# Patient Record
Sex: FEMALE | Employment: FULL TIME | URBAN - METROPOLITAN AREA
[De-identification: names, ages, dates, MRNs, and addresses within clinical notes are randomized per-mention and may not be internally consistent; named-entity substitution may affect disease eponyms.]

---

## 2020-06-08 ENCOUNTER — OFFICE VISIT (OUTPATIENT)
Dept: FAMILY MEDICINE | Facility: CLINIC | Age: 30
End: 2020-06-08
Payer: COMMERCIAL

## 2020-06-08 ENCOUNTER — ANCILLARY PROCEDURE (OUTPATIENT)
Dept: GENERAL RADIOLOGY | Facility: CLINIC | Age: 30
End: 2020-06-08
Attending: FAMILY MEDICINE
Payer: COMMERCIAL

## 2020-06-08 VITALS
BODY MASS INDEX: 37.95 KG/M2 | DIASTOLIC BLOOD PRESSURE: 83 MMHG | SYSTOLIC BLOOD PRESSURE: 144 MMHG | OXYGEN SATURATION: 98 % | WEIGHT: 241.8 LBS | HEIGHT: 67 IN | TEMPERATURE: 98.2 F | RESPIRATION RATE: 16 BRPM | HEART RATE: 67 BPM

## 2020-06-08 DIAGNOSIS — M79.672 LEFT FOOT PAIN: Primary | ICD-10-CM

## 2020-06-08 DIAGNOSIS — M79.672 LEFT FOOT PAIN: ICD-10-CM

## 2020-06-08 PROCEDURE — 99203 OFFICE O/P NEW LOW 30 MIN: CPT | Performed by: FAMILY MEDICINE

## 2020-06-08 PROCEDURE — 73630 X-RAY EXAM OF FOOT: CPT | Mod: LT

## 2020-06-08 ASSESSMENT — MIFFLIN-ST. JEOR: SCORE: 1854.55

## 2020-06-08 NOTE — PROGRESS NOTES
"Subjective     Natalee Sánchez is a 29 year old female who presents to clinic today for the following health issues:    HPI   Pain  Onset: Saturday night    Description:   Location: left foot-top of foot, big toe    Progression of Symptoms: worse    Accompanying Signs & Symptoms:  Other symptoms: swelling and discoloration of by toes    Precipitating factors:   Trauma or overuse: YES- sleep walks-thinks she went down the stairs and slipped  Therapies Tried and outcome: ice, elevation. ibu     Pt woke up when she was sleepwalking due to pain in her left big toe/foot  Does not know what happened other than she suspects she must have hit it on something  She did not fall but found herself on the stairs.   She has been icing and elevating her foot  It hurts to bear weight on her left foot        Reviewed and updated as needed this visit by Provider         Review of Systems   Constitutional, HEENT, cardiovascular, pulmonary, gi and gu systems are negative, except as otherwise noted.      Objective    There were no vitals taken for this visit.  There is no height or weight on file to calculate BMI.  Physical Exam   GENERAL: healthy, alert and no distress  MS: left foot with some bruising, swelling and pain to palpation over left 1st metatarsal    Diagnostic Test Results:  Labs reviewed in Epic  Xray - left foot: no fractures noted        Assessment & Plan     1. Left foot pain  Due to presumed injury, needs rest, elevation, icing, stiff soled shoe. Follow-up as needed  - XR Foot Left G/E 3 Views; Future     BMI:   Estimated body mass index is 37.51 kg/m  as calculated from the following:    Height as of this encounter: 1.71 m (5' 7.32\").    Weight as of this encounter: 109.7 kg (241 lb 12.8 oz).   Weight management plan: Discussed healthy diet and exercise guidelines            No follow-ups on file.    Catrina Langford MD  [facility]    "

## 2020-12-06 ENCOUNTER — HEALTH MAINTENANCE LETTER (OUTPATIENT)
Age: 30
End: 2020-12-06

## 2021-09-25 ENCOUNTER — HEALTH MAINTENANCE LETTER (OUTPATIENT)
Age: 31
End: 2021-09-25

## 2022-01-15 ENCOUNTER — HEALTH MAINTENANCE LETTER (OUTPATIENT)
Age: 32
End: 2022-01-15

## 2023-01-07 ENCOUNTER — HEALTH MAINTENANCE LETTER (OUTPATIENT)
Age: 33
End: 2023-01-07

## 2023-04-22 ENCOUNTER — HEALTH MAINTENANCE LETTER (OUTPATIENT)
Age: 33
End: 2023-04-22